# Patient Record
Sex: FEMALE | Race: WHITE | NOT HISPANIC OR LATINO | Employment: FULL TIME | URBAN - NONMETROPOLITAN AREA
[De-identification: names, ages, dates, MRNs, and addresses within clinical notes are randomized per-mention and may not be internally consistent; named-entity substitution may affect disease eponyms.]

---

## 2017-01-19 ENCOUNTER — HOSPITAL ENCOUNTER (EMERGENCY)
Facility: HOSPITAL | Age: 27
Discharge: HOME | End: 2017-01-19
Admitting: PHYSICIAN ASSISTANT

## 2017-01-19 VITALS
HEART RATE: 81 BPM | HEIGHT: 62 IN | WEIGHT: 117.2 LBS | TEMPERATURE: 98.1 F | OXYGEN SATURATION: 100 % | RESPIRATION RATE: 20 BRPM | DIASTOLIC BLOOD PRESSURE: 80 MMHG | SYSTOLIC BLOOD PRESSURE: 133 MMHG | BODY MASS INDEX: 21.57 KG/M2

## 2017-01-19 DIAGNOSIS — J06.9 ACUTE URI: ICD-10-CM

## 2017-01-19 DIAGNOSIS — N39.0 ACUTE UTI: Primary | ICD-10-CM

## 2017-01-19 LAB
BACTERIA UR QL AUTO: ABNORMAL /HPF
BILIRUB UR QL STRIP: NEGATIVE
CLARITY UR: CLEAR
COLOR UR: YELLOW
GLUCOSE UR STRIP-MCNC: NEGATIVE MG/DL
HGB UR QL STRIP.AUTO: ABNORMAL
HYALINE CASTS UR QL AUTO: ABNORMAL /LPF
KETONES UR QL STRIP: NEGATIVE
LEUKOCYTE ESTERASE UR QL STRIP.AUTO: ABNORMAL
NITRITE UR QL STRIP: NEGATIVE
PH UR STRIP.AUTO: 7 [PH] (ref 5–8)
PROT UR QL STRIP: NEGATIVE
RBC # UR: ABNORMAL /HPF
REF LAB TEST METHOD: ABNORMAL
S PYO AG THROAT QL: NEGATIVE
SP GR UR STRIP: 1.01 (ref 1–1.03)
SQUAMOUS #/AREA URNS HPF: ABNORMAL /HPF
UROBILINOGEN UR QL STRIP: ABNORMAL
WBC UR QL AUTO: ABNORMAL /HPF

## 2017-01-19 PROCEDURE — 81001 URINALYSIS AUTO W/SCOPE: CPT | Performed by: PHYSICIAN ASSISTANT

## 2017-01-19 PROCEDURE — 87880 STREP A ASSAY W/OPTIC: CPT | Performed by: PHYSICIAN ASSISTANT

## 2017-01-19 PROCEDURE — 87147 CULTURE TYPE IMMUNOLOGIC: CPT | Performed by: PHYSICIAN ASSISTANT

## 2017-01-19 PROCEDURE — 87086 URINE CULTURE/COLONY COUNT: CPT | Performed by: PHYSICIAN ASSISTANT

## 2017-01-19 PROCEDURE — 99283 EMERGENCY DEPT VISIT LOW MDM: CPT

## 2017-01-19 PROCEDURE — 87081 CULTURE SCREEN ONLY: CPT | Performed by: PHYSICIAN ASSISTANT

## 2017-01-19 PROCEDURE — 87077 CULTURE AEROBIC IDENTIFY: CPT | Performed by: PHYSICIAN ASSISTANT

## 2017-01-19 RX ORDER — CIPROFLOXACIN 500 MG/1
500 TABLET, FILM COATED ORAL 2 TIMES DAILY
Qty: 14 TABLET | Refills: 0 | Status: SHIPPED | OUTPATIENT
Start: 2017-01-19 | End: 2017-01-26

## 2017-01-19 RX ORDER — PHENAZOPYRIDINE HYDROCHLORIDE 100 MG/1
100 TABLET, FILM COATED ORAL 3 TIMES DAILY PRN
Qty: 3 TABLET | Refills: 0 | Status: SHIPPED | OUTPATIENT
Start: 2017-01-19

## 2017-01-21 LAB — BACTERIA SPEC AEROBE CULT: NORMAL

## 2017-01-22 LAB — BACTERIA SPEC AEROBE CULT: ABNORMAL

## 2017-11-07 ENCOUNTER — OFFICE VISIT (OUTPATIENT)
Dept: FAMILY MEDICINE CLINIC | Age: 27
End: 2017-11-07
Payer: MEDICAID

## 2017-11-07 VITALS
SYSTOLIC BLOOD PRESSURE: 102 MMHG | BODY MASS INDEX: 21.44 KG/M2 | TEMPERATURE: 98.2 F | WEIGHT: 117.2 LBS | DIASTOLIC BLOOD PRESSURE: 68 MMHG | OXYGEN SATURATION: 98 % | HEART RATE: 94 BPM | RESPIRATION RATE: 16 BRPM

## 2017-11-07 DIAGNOSIS — H66.002 ACUTE SUPPURATIVE OTITIS MEDIA OF LEFT EAR WITHOUT SPONTANEOUS RUPTURE OF TYMPANIC MEMBRANE, RECURRENCE NOT SPECIFIED: Primary | ICD-10-CM

## 2017-11-07 PROCEDURE — 99213 OFFICE O/P EST LOW 20 MIN: CPT | Performed by: NURSE PRACTITIONER

## 2017-11-07 RX ORDER — CIPROFLOXACIN AND DEXAMETHASONE 3; 1 MG/ML; MG/ML
4 SUSPENSION/ DROPS AURICULAR (OTIC) 2 TIMES DAILY
Qty: 7.5 ML | Refills: 0 | Status: SHIPPED | OUTPATIENT
Start: 2017-11-07 | End: 2017-11-14

## 2017-11-07 RX ORDER — METHYLPREDNISOLONE 4 MG/1
TABLET ORAL
Qty: 1 KIT | Refills: 0 | Status: SHIPPED | OUTPATIENT
Start: 2017-11-07 | End: 2017-11-13

## 2017-11-07 RX ORDER — CEFDINIR 300 MG/1
300 CAPSULE ORAL 2 TIMES DAILY
Qty: 20 CAPSULE | Refills: 0 | Status: SHIPPED | OUTPATIENT
Start: 2017-11-07 | End: 2017-11-17

## 2017-11-07 ASSESSMENT — ENCOUNTER SYMPTOMS
RHINORRHEA: 1
SORE THROAT: 1
COUGH: 1
ABDOMINAL PAIN: 0

## 2017-11-07 NOTE — PROGRESS NOTES
She is not diaphoretic. Psychiatric: Her behavior is normal. Thought content normal.   Nursing note and vitals reviewed. /68 (Site: Right Arm, Position: Sitting, Cuff Size: Medium Adult)   Pulse 94   Temp 98.2 °F (36.8 °C) (Oral)   Resp 16   Wt 117 lb 3.2 oz (53.2 kg)   LMP 10/25/2017 (Approximate)   SpO2 98%   BMI 21.44 kg/m²     Assessment:      1. Acute suppurative otitis media of left ear without spontaneous rupture of tympanic membrane, recurrence not specified  methylPREDNISolone (MEDROL DOSEPACK) 4 MG tablet    cefdinir (OMNICEF) 300 MG capsule    ciprofloxacin-dexamethasone (CIPRODEX) 0.3-0.1 % otic suspension           Plan:      Patient has ruptured TM before in the past, ciprodex topical.   Increase fluids and rest.  Tylenol or ibuprofen as needed. Follow up in two weeks to recheck ear/TM. Vitamin C and zinc supplement as directed. Throat/cough lozenges as needed.

## 2020-12-09 ENCOUNTER — OFFICE VISIT (OUTPATIENT)
Dept: PRIMARY CARE CLINIC | Age: 30
End: 2020-12-09
Payer: MEDICAID

## 2020-12-09 VITALS
HEIGHT: 62 IN | TEMPERATURE: 98.6 F | RESPIRATION RATE: 18 BRPM | DIASTOLIC BLOOD PRESSURE: 72 MMHG | BODY MASS INDEX: 20.65 KG/M2 | HEART RATE: 84 BPM | WEIGHT: 112.2 LBS | SYSTOLIC BLOOD PRESSURE: 118 MMHG

## 2020-12-09 PROCEDURE — 99213 OFFICE O/P EST LOW 20 MIN: CPT | Performed by: NURSE PRACTITIONER

## 2020-12-09 RX ORDER — METHOCARBAMOL 500 MG/1
500 TABLET, FILM COATED ORAL 3 TIMES DAILY
Qty: 21 TABLET | Refills: 0 | Status: SHIPPED | OUTPATIENT
Start: 2020-12-09 | End: 2020-12-16

## 2020-12-09 RX ORDER — TRIAMCINOLONE ACETONIDE 40 MG/ML
60 INJECTION, SUSPENSION INTRA-ARTICULAR; INTRAMUSCULAR ONCE
Status: COMPLETED | OUTPATIENT
Start: 2020-12-09 | End: 2020-12-09

## 2020-12-09 RX ADMIN — TRIAMCINOLONE ACETONIDE 60 MG: 40 INJECTION, SUSPENSION INTRA-ARTICULAR; INTRAMUSCULAR at 11:23

## 2020-12-09 ASSESSMENT — PATIENT HEALTH QUESTIONNAIRE - PHQ9
SUM OF ALL RESPONSES TO PHQ QUESTIONS 1-9: 0
1. LITTLE INTEREST OR PLEASURE IN DOING THINGS: 0
DEPRESSION UNABLE TO ASSESS: PT REFUSES
SUM OF ALL RESPONSES TO PHQ9 QUESTIONS 1 & 2: 0
2. FEELING DOWN, DEPRESSED OR HOPELESS: 0

## 2020-12-09 ASSESSMENT — ENCOUNTER SYMPTOMS
VOMITING: 0
SORE THROAT: 0
HEMOPTYSIS: 0
ORTHOPNEA: 0
SPUTUM PRODUCTION: 0
RHINORRHEA: 0
ABDOMINAL PAIN: 0
COUGH: 0
NAUSEA: 0
CHEST TIGHTNESS: 0
BACK PAIN: 0
SINUS PAIN: 0
SHORTNESS OF BREATH: 0

## 2020-12-09 NOTE — LETTER
[facility] ([facility] [address]  Phone: [phone]  Fax: [phone]    MARISSA Morris CNP        December 9, 2020     Patient: Rachael Marie   YOB: 1990   Date of Visit: 12/9/2020       To Whom It May Concern:    Rachael Marie was seen in our clinic on 12/9/2020. She may return to work on 12/10/2020. If you have any questions or concerns, please don't hesitate to call.     Sincerely,        MARISSA Morris CNP

## 2020-12-09 NOTE — PROGRESS NOTES
[facility] [place] [facility] [address] [address]  Dept: [phone]  Dept Fax: [phone]  Loc: [phone]    Lili Hoffmann is a 31 y.o. female who presents today for her medical conditions/complaintsas noted below. Lili Hoffmann is c/o of Shoulder Pain (Patient states has had Right shoulder pain and chest pain x 1 week. Patient states she was seen at [facility] [place] [facility]. Patient has had 2 EKG's and a chest X-ray [facility] was negative )      HPI:   Patient states that the pain started about 1 week ago in her right upper chest and shoulder. .  Hurts when she takes a deep breath or if she put weight on her right arm around the ribs of her upper right chest and axilla. About 1 month ago remembers shoulder popping and having pain. But then it become better. Was at the ER Saturday and they did chest xray that was normal and EKG was normal.  She went to [facility] [place] yesterday and they said that it maybe anxiety and did not do anything for her. She states that she thinks that it is muscular and is not sure what to do for relief. No shortness of breath, no nausea, pain does not radiate to arm neck or jaw. Chest Pain    This is a new problem. The current episode started in the past 7 days. The onset quality is gradual. The problem occurs 2 to 4 times per day. The problem has been unchanged. Pain location: right upper chest and right lateral chest. The pain is at a severity of 3/10. The pain is mild. The quality of the pain is described as burning and sharp. The pain radiates to the right shoulder. Pertinent negatives include no abdominal pain, back pain, claudication, cough, diaphoresis, dizziness, exertional chest pressure, fever, headaches, hemoptysis, irregular heartbeat, leg pain, lower extremity edema, malaise/fatigue, nausea, near-syncope, numbness, orthopnea, palpitations, PND, shortness of breath, sputum production, syncope, vomiting or weakness.  The pain is aggravated by deep breathing, lifting, lifting arm and movement. She has tried acetaminophen for the symptoms. The treatment provided no relief. There are no known risk factors. Pertinent negatives for past medical history include no CHF and no MI. History reviewed. No pertinent past medical history. Past Surgical History:   Procedure Laterality Date    TONSILLECTOMY       History reviewed. No pertinent family history. Social History     Tobacco Use    Smoking status: Current Every Day Smoker    Smokeless tobacco: Never Used   Substance Use Topics    Alcohol use: No     Alcohol/week: 0.0 standard drinks      No current outpatient medications on file prior to visit. No current facility-administered medications on file prior to visit. Allergies   Allergen Reactions    Pcn [Penicillins]      Health Maintenance   Topic Date Due    Varicella vaccine (1 of 2 - 2-dose childhood series) 10/21/1993    HIV screen  10/21/2007    DTaP/Tdap/Td vaccine (1 - Tdap) 10/21/2011    Cervical cancer screen  10/21/2013    Flu vaccine (1) 09/01/2020    Hepatitis A vaccine  Aged Out    Hepatitis B vaccine  Aged Out    Hib vaccine  Aged Out    Meningococcal (ACWY) vaccine  Aged Out    Pneumococcal 0-64 years Vaccine  Aged Out       Subjective:   Review of Systems   Constitutional: Negative for chills, diaphoresis, fatigue, fever and malaise/fatigue. HENT: Negative for congestion, ear pain, postnasal drip, rhinorrhea, sinus pain and sore throat. Respiratory: Negative for cough, hemoptysis, sputum production, chest tightness and shortness of breath. Cardiovascular: Positive for chest pain. Negative for palpitations, orthopnea, claudication, syncope, PND and near-syncope. Gastrointestinal: Negative for abdominal pain, nausea and vomiting. Musculoskeletal: Positive for arthralgias (right shoulder and rib pain). Negative for back pain. Skin: Negative for rash.    Neurological: Negative for dizziness, weakness, numbness and headaches. Objective:   /72 (Site: Right Upper Arm, Position: Sitting)   Pulse 84   Temp 98.6 °F (37 °C) (Temporal)   Resp 18   Ht 5' 2\" (1.575 m)   Wt 112 lb 3.2 oz (50.9 kg)   LMP 11/25/2020   BMI 20.52 kg/m²    Physical Exam  Vitals signs and nursing note reviewed. Constitutional:       General: She is not in acute distress. Appearance: Normal appearance. She is not ill-appearing. HENT:      Head: Normocephalic. Eyes:      Pupils: Pupils are equal, round, and reactive to light. Neck:      Musculoskeletal: Normal range of motion and neck supple. Cardiovascular:      Rate and Rhythm: Normal rate and regular rhythm. Pulses: Normal pulses. Heart sounds: Normal heart sounds. Pulmonary:      Effort: Pulmonary effort is normal.      Breath sounds: Normal breath sounds. Musculoskeletal:         General: Tenderness (reproducible sharp pain with AP pressure applied to right chest. ) present. No swelling or signs of injury. Right shoulder: She exhibits tenderness. She exhibits normal range of motion, no bony tenderness, no swelling, no effusion and no crepitus. Skin:     General: Skin is warm and dry. Capillary Refill: Capillary refill takes less than 2 seconds. Neurological:      Mental Status: She is alert. No results found for this visit on 12/09/20. Assessment:      Diagnosis Orders   1. Costochondritis, acute  triamcinolone acetonide (KENALOG-40) injection 60 mg    methocarbamol (ROBAXIN) 500 MG tablet       Plan:   Sissy Brewster was seen today for shoulder pain. Diagnoses and all orders for this visit:    Costochondritis, acute  -     triamcinolone acetonide (KENALOG-40) injection 60 mg  -     methocarbamol (ROBAXIN) 500 MG tablet; Take 1 tablet by mouth 3 times daily for 7 days         No follow-ups on file. Rest as needed. Will give steroid shot in the office today to decrease inflammation.   Will give muscle

## 2022-07-11 ENCOUNTER — OFFICE VISIT (OUTPATIENT)
Dept: PRIMARY CARE CLINIC | Age: 32
End: 2022-07-11
Payer: MEDICAID

## 2022-07-11 VITALS
SYSTOLIC BLOOD PRESSURE: 114 MMHG | DIASTOLIC BLOOD PRESSURE: 70 MMHG | HEIGHT: 62 IN | WEIGHT: 126 LBS | HEART RATE: 68 BPM | BODY MASS INDEX: 23.19 KG/M2 | OXYGEN SATURATION: 99 % | RESPIRATION RATE: 18 BRPM | TEMPERATURE: 98.2 F

## 2022-07-11 DIAGNOSIS — H65.112 ACUTE MUCOID OTITIS MEDIA OF LEFT EAR: Primary | ICD-10-CM

## 2022-07-11 DIAGNOSIS — H65.91 OTITIS MEDIA WITH EFFUSION, RIGHT: ICD-10-CM

## 2022-07-11 DIAGNOSIS — J01.90 ACUTE RHINOSINUSITIS: ICD-10-CM

## 2022-07-11 PROCEDURE — 99213 OFFICE O/P EST LOW 20 MIN: CPT | Performed by: NURSE PRACTITIONER

## 2022-07-11 RX ORDER — AZITHROMYCIN 250 MG/1
250 TABLET, FILM COATED ORAL SEE ADMIN INSTRUCTIONS
Qty: 6 TABLET | Refills: 0 | Status: SHIPPED | OUTPATIENT
Start: 2022-07-11 | End: 2022-07-16

## 2022-07-11 RX ORDER — CETIRIZINE HYDROCHLORIDE 10 MG/1
10 TABLET ORAL DAILY
Qty: 30 TABLET | Refills: 0 | Status: SHIPPED | OUTPATIENT
Start: 2022-07-11 | End: 2022-07-26 | Stop reason: ALTCHOICE

## 2022-07-11 RX ORDER — METHYLPREDNISOLONE 4 MG/1
TABLET ORAL
Qty: 1 KIT | Refills: 0 | Status: SHIPPED | OUTPATIENT
Start: 2022-07-11 | End: 2022-07-26 | Stop reason: ALTCHOICE

## 2022-07-11 RX ORDER — FLUTICASONE PROPIONATE 50 MCG
2 SPRAY, SUSPENSION (ML) NASAL DAILY
Qty: 16 G | Refills: 0 | Status: SHIPPED | OUTPATIENT
Start: 2022-07-11

## 2022-07-11 ASSESSMENT — ENCOUNTER SYMPTOMS
SINUS PRESSURE: 1
GASTROINTESTINAL NEGATIVE: 1
RESPIRATORY NEGATIVE: 1
ALLERGIC/IMMUNOLOGIC NEGATIVE: 1
EYES NEGATIVE: 1

## 2022-07-11 NOTE — PROGRESS NOTES
Allergic/Immunologic: Negative. Neurological: Negative. Hematological: Negative. Psychiatric/Behavioral: Negative. Objective   Physical Exam  Vitals reviewed. HENT:      Right Ear: Ear canal and external ear normal. No drainage. A middle ear effusion is present. Tympanic membrane is not perforated or erythematous. Left Ear: Ear canal and external ear normal. No drainage. A middle ear effusion is present. Tympanic membrane is bulging. Tympanic membrane is not scarred, perforated or erythematous. Ears:      Comments: Mucous visible in the left ear     Nose:      Right Sinus: No maxillary sinus tenderness or frontal sinus tenderness. Left Sinus: No maxillary sinus tenderness or frontal sinus tenderness. Mouth/Throat:      Lips: Pink. Mouth: Mucous membranes are moist.      Pharynx: Posterior oropharyngeal erythema (postnasal drainage) present. No oropharyngeal exudate. Cardiovascular:      Rate and Rhythm: Normal rate and regular rhythm. Heart sounds: Normal heart sounds. Pulmonary:      Effort: Pulmonary effort is normal.      Breath sounds: Normal breath sounds. Lymphadenopathy:      Head:      Right side of head: No submandibular or tonsillar adenopathy. Left side of head: No submandibular or tonsillar adenopathy. Cervical:      Right cervical: No superficial cervical adenopathy. Left cervical: No superficial cervical adenopathy. Skin:     General: Skin is warm and dry. Neurological:      Mental Status: She is alert. An electronic signature was used to authenticate this note. --MARISSA Robison - CNP     EMR Dragon/translation disclaimer: Much of this encounter note is an electronic transcription/translation of spoken language to printed text. The electronic translation of spoken language may be erroneous, or at times, nonsensical words or phrases may be inadvertently transcribed.   Although I have reviewed the note for such errors, some may still exist.

## 2022-07-11 NOTE — PATIENT INSTRUCTIONS
Completely finished antibiotics. May take a probiotic or eat yogurt for GI health while taking antibiotics. Apply heat to help alleviate pain. Tylenol or Motrin for fever or pain as needed. Avoid getting water or went into ear. May place a cottonball in ear to protect against wind. I have recommended having ears rechecked in 2 weeks by your primary care provider to ensure infection has cleared, or sooner if symptoms do not improve.

## 2022-07-26 ENCOUNTER — OFFICE VISIT (OUTPATIENT)
Dept: PRIMARY CARE CLINIC | Age: 32
End: 2022-07-26
Payer: MEDICAID

## 2022-07-26 VITALS
HEART RATE: 98 BPM | TEMPERATURE: 97.8 F | WEIGHT: 126.5 LBS | HEIGHT: 62 IN | DIASTOLIC BLOOD PRESSURE: 60 MMHG | SYSTOLIC BLOOD PRESSURE: 100 MMHG | BODY MASS INDEX: 23.28 KG/M2 | OXYGEN SATURATION: 98 %

## 2022-07-26 DIAGNOSIS — R42 EPISODIC LIGHTHEADEDNESS: ICD-10-CM

## 2022-07-26 DIAGNOSIS — R00.2 PALPITATIONS: ICD-10-CM

## 2022-07-26 DIAGNOSIS — M62.81 MUSCLE WEAKNESS: ICD-10-CM

## 2022-07-26 DIAGNOSIS — R42 EPISODIC LIGHTHEADEDNESS: Primary | ICD-10-CM

## 2022-07-26 DIAGNOSIS — R42 DIZZINESS: ICD-10-CM

## 2022-07-26 DIAGNOSIS — H69.83 DYSFUNCTION OF BOTH EUSTACHIAN TUBES: ICD-10-CM

## 2022-07-26 LAB
ALBUMIN SERPL-MCNC: 4.7 G/DL (ref 3.5–5.2)
ALP BLD-CCNC: 55 U/L (ref 35–104)
ALT SERPL-CCNC: 12 U/L (ref 5–33)
ANION GAP SERPL CALCULATED.3IONS-SCNC: 13 MMOL/L (ref 7–19)
AST SERPL-CCNC: 19 U/L (ref 5–32)
BASOPHILS ABSOLUTE: 0.1 K/UL (ref 0–0.2)
BASOPHILS RELATIVE PERCENT: 0.7 % (ref 0–1)
BILIRUB SERPL-MCNC: <0.2 MG/DL (ref 0.2–1.2)
BUN BLDV-MCNC: 10 MG/DL (ref 6–20)
CALCIUM SERPL-MCNC: 10 MG/DL (ref 8.6–10)
CHLORIDE BLD-SCNC: 106 MMOL/L (ref 98–111)
CO2: 24 MMOL/L (ref 22–29)
CREAT SERPL-MCNC: 0.7 MG/DL (ref 0.5–0.9)
EOSINOPHILS ABSOLUTE: 0.5 K/UL (ref 0–0.6)
EOSINOPHILS RELATIVE PERCENT: 5.1 % (ref 0–5)
GFR AFRICAN AMERICAN: >59
GFR NON-AFRICAN AMERICAN: >60
GLUCOSE BLD-MCNC: 78 MG/DL (ref 74–109)
HCT VFR BLD CALC: 40.2 % (ref 37–47)
HEMOGLOBIN: 13.2 G/DL (ref 12–16)
IMMATURE GRANULOCYTES #: 0 K/UL
LYMPHOCYTES ABSOLUTE: 2.9 K/UL (ref 1.1–4.5)
LYMPHOCYTES RELATIVE PERCENT: 29.6 % (ref 20–40)
MAGNESIUM: 2.4 MG/DL (ref 1.6–2.6)
MCH RBC QN AUTO: 32.7 PG (ref 27–31)
MCHC RBC AUTO-ENTMCNC: 32.8 G/DL (ref 33–37)
MCV RBC AUTO: 99.5 FL (ref 81–99)
MONOCYTES ABSOLUTE: 0.7 K/UL (ref 0–0.9)
MONOCYTES RELATIVE PERCENT: 6.9 % (ref 0–10)
NEUTROPHILS ABSOLUTE: 5.5 K/UL (ref 1.5–7.5)
NEUTROPHILS RELATIVE PERCENT: 57.5 % (ref 50–65)
PDW BLD-RTO: 13.6 % (ref 11.5–14.5)
PLATELET # BLD: 243 K/UL (ref 130–400)
PMV BLD AUTO: 11.9 FL (ref 9.4–12.3)
POTASSIUM SERPL-SCNC: 3.9 MMOL/L (ref 3.5–5)
RBC # BLD: 4.04 M/UL (ref 4.2–5.4)
SODIUM BLD-SCNC: 143 MMOL/L (ref 136–145)
T4 FREE: 1.53 NG/DL (ref 0.93–1.7)
TOTAL PROTEIN: 7.2 G/DL (ref 6.6–8.7)
TSH SERPL DL<=0.05 MIU/L-ACNC: 0.12 UIU/ML (ref 0.27–4.2)
WBC # BLD: 9.6 K/UL (ref 4.8–10.8)

## 2022-07-26 PROCEDURE — 99214 OFFICE O/P EST MOD 30 MIN: CPT | Performed by: NURSE PRACTITIONER

## 2022-07-26 ASSESSMENT — PATIENT HEALTH QUESTIONNAIRE - PHQ9
SUM OF ALL RESPONSES TO PHQ QUESTIONS 1-9: 11
SUM OF ALL RESPONSES TO PHQ9 QUESTIONS 1 & 2: 3
SUM OF ALL RESPONSES TO PHQ QUESTIONS 1-9: 11
2. FEELING DOWN, DEPRESSED OR HOPELESS: 1
SUM OF ALL RESPONSES TO PHQ QUESTIONS 1-9: 11
6. FEELING BAD ABOUT YOURSELF - OR THAT YOU ARE A FAILURE OR HAVE LET YOURSELF OR YOUR FAMILY DOWN: 0
7. TROUBLE CONCENTRATING ON THINGS, SUCH AS READING THE NEWSPAPER OR WATCHING TELEVISION: 2
8. MOVING OR SPEAKING SO SLOWLY THAT OTHER PEOPLE COULD HAVE NOTICED. OR THE OPPOSITE, BEING SO FIGETY OR RESTLESS THAT YOU HAVE BEEN MOVING AROUND A LOT MORE THAN USUAL: 0
9. THOUGHTS THAT YOU WOULD BE BETTER OFF DEAD, OR OF HURTING YOURSELF: 0
5. POOR APPETITE OR OVEREATING: 2
10. IF YOU CHECKED OFF ANY PROBLEMS, HOW DIFFICULT HAVE THESE PROBLEMS MADE IT FOR YOU TO DO YOUR WORK, TAKE CARE OF THINGS AT HOME, OR GET ALONG WITH OTHER PEOPLE: 0
3. TROUBLE FALLING OR STAYING ASLEEP: 1
4. FEELING TIRED OR HAVING LITTLE ENERGY: 3
1. LITTLE INTEREST OR PLEASURE IN DOING THINGS: 2
SUM OF ALL RESPONSES TO PHQ QUESTIONS 1-9: 11

## 2022-07-26 ASSESSMENT — ENCOUNTER SYMPTOMS
SORE THROAT: 0
NAUSEA: 0
COUGH: 1
SHORTNESS OF BREATH: 0
VOMITING: 0
DIARRHEA: 0
ABDOMINAL PAIN: 0

## 2022-07-26 NOTE — PROGRESS NOTES
Kenneth Dominguez (:  1990) is a 36 y.o. female,Established patient, here for evaluation of the following chief complaint(s):  Ear Fullness and Dizziness      ASSESSMENT/PLAN:    ICD-10-CM    1. Episodic lightheadedness  R42 CBC with Auto Differential     Comprehensive Metabolic Panel     TSH     T4, Free     Longterm Continuous Cardiac Event Monitor  Slow position changes      2. Dysfunction of both eustachian tubes  H69.83 Increase Flonase to twice daily      3. Palpitations  R00.2 TSH     T4, Free     Longterm Continuous Cardiac Event Monitor      4. Dizziness  R42 Longterm Continuous Cardiac Event Monitor      5. Muscle weakness  M62.81 Magnesium          Return in about 6 weeks (around 2022), or if symptoms worsen or fail to improve. SUBJECTIVE/OBJECTIVE:  HPI    Reports dizziness. \"This is hard from me to explain. \"  Denies a spinning sensation. \"This comes and goes. \"  Reports lightheadedness. \"If I bend over to pick something up and stand back up, I am going to feel it. \"  \"Normally, I am okay first thing in the morning. \"  She feels like she might pass out. She feels like her heart \"flutters. \"  \"I have been dealing with this for a year and half with my ears. \"  Reports a decrease in her hearing. \"I struggle. \"  Her ears feel full. She was recently on a steroid and antibiotic. Pressure is better. She is currently on Flonase daily. Episodes last from 20 minutes to hours    LAYING BP: 110/60, 71  SITTING BP: 104/60, 86 (Dizzy from sitting to standing)  STANDING BP: 100/60, 98    /60 (Position: Standing)   Pulse 98   Temp 97.8 °F (36.6 °C) (Temporal)   Ht 5' 2\" (1.575 m)   Wt 126 lb 8 oz (57.4 kg)   LMP 2022 (Approximate)   SpO2 98%   BMI 23.14 kg/m²     Review of Systems   Constitutional:  Negative for fever. HENT:  Positive for congestion and ear pain (ear pressure). Negative for sore throat. Eyes:  Negative for visual disturbance.         Denies blurred vision or double vision   Respiratory:  Positive for cough (occassional if things get bad). Negative for shortness of breath. Cardiovascular:  Positive for palpitations. Negative for chest pain. Gastrointestinal:  Negative for abdominal pain, diarrhea, nausea and vomiting. Genitourinary:  Negative for dysuria. Neurological:  Positive for dizziness and light-headedness. Physical Exam  Vitals reviewed. Constitutional:       Appearance: Normal appearance. She is well-developed and normal weight. HENT:      Head: Normocephalic. Right Ear: External ear normal. Tympanic membrane is scarred. Left Ear: External ear normal. Tympanic membrane is scarred. Nose: Nose normal.   Eyes:      General:         Right eye: No discharge. Left eye: No discharge. Cardiovascular:      Rate and Rhythm: Normal rate and regular rhythm. Pulmonary:      Effort: Pulmonary effort is normal.      Breath sounds: Normal breath sounds. No wheezing, rhonchi or rales. Abdominal:      General: Bowel sounds are normal.      Palpations: Abdomen is soft. Musculoskeletal:         General: Normal range of motion. Cervical back: Normal range of motion. Skin:     General: Skin is dry. Neurological:      General: No focal deficit present. Mental Status: She is alert and oriented to person, place, and time. Mental status is at baseline. Psychiatric:         Mood and Affect: Mood normal.         Behavior: Behavior normal.         Thought Content: Thought content normal.         Judgment: Judgment normal.               An electronic signature was used to authenticate this note.     --Suzette Cates, MARISSA

## 2022-07-27 ENCOUNTER — TELEPHONE (OUTPATIENT)
Dept: PRIMARY CARE CLINIC | Age: 32
End: 2022-07-27

## 2022-08-11 ENCOUNTER — TELEPHONE (OUTPATIENT)
Dept: PRIMARY CARE CLINIC | Age: 32
End: 2022-08-11

## 2022-08-11 DIAGNOSIS — R42 DIZZINESS: Primary | ICD-10-CM

## 2022-08-11 NOTE — TELEPHONE ENCOUNTER
----- Message from MARISSA Chavez sent at 8/10/2022  4:13 PM CDT -----  Zio patch was worn for 4 days and 3 hours. It is normal.  Patient is in sinus rhythm from 47 up to 139. Please check on her symptoms of dizziness.

## 2022-09-14 ENCOUNTER — OFFICE VISIT (OUTPATIENT)
Dept: ENT CLINIC | Age: 32
End: 2022-09-14
Payer: MEDICAID

## 2022-09-14 ENCOUNTER — PROCEDURE VISIT (OUTPATIENT)
Dept: ENT CLINIC | Age: 32
End: 2022-09-14
Payer: MEDICAID

## 2022-09-14 VITALS
BODY MASS INDEX: 23 KG/M2 | WEIGHT: 125 LBS | SYSTOLIC BLOOD PRESSURE: 112 MMHG | HEIGHT: 62 IN | DIASTOLIC BLOOD PRESSURE: 62 MMHG

## 2022-09-14 DIAGNOSIS — R26.89 BALANCE DISORDER: Primary | ICD-10-CM

## 2022-09-14 DIAGNOSIS — R42 DIZZINESS: Primary | ICD-10-CM

## 2022-09-14 PROCEDURE — 92553 AUDIOMETRY AIR & BONE: CPT | Performed by: AUDIOLOGIST

## 2022-09-14 PROCEDURE — 99204 OFFICE O/P NEW MOD 45 MIN: CPT | Performed by: OTOLARYNGOLOGY

## 2022-09-14 PROCEDURE — 92567 TYMPANOMETRY: CPT | Performed by: AUDIOLOGIST

## 2022-09-14 RX ORDER — PREDNISONE 20 MG/1
TABLET ORAL
Qty: 21 TABLET | Refills: 0 | Status: SHIPPED | OUTPATIENT
Start: 2022-09-14

## 2022-09-14 ASSESSMENT — ENCOUNTER SYMPTOMS
GASTROINTESTINAL NEGATIVE: 1
RESPIRATORY NEGATIVE: 1
ALLERGIC/IMMUNOLOGIC NEGATIVE: 1
EYES NEGATIVE: 1

## 2022-09-14 NOTE — PROGRESS NOTES
History   Jacqueline Fernandez is a 36 y.o. female who presented to the clinic this date with complaints of dizziness and decreased hearing. She reported symptoms have been ongoing for several months. She was treated for an ear infection then started having dizzy spells and losing balance. She denied true vertigo. She noted the hearing problems have started in the last month. She noted bilateral aural fullness. She reported occasional headaches. She reported occasional tinnitus. Summary   Results obtained today are consistent with normal TM mobility and normal hearing bilaterally. A Keesha Fluke was performed and was negative for BPPV bilaterally. 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 Anita Ghosh and the following recommendations were made: Follow up with ENT as scheduled.         Audiogram and Acoustic Immittance

## 2022-09-14 NOTE — PROGRESS NOTES
2022    Jacinto Pablo (:  1990) is a 36 y.o. female, Established patient, here for evaluation of the following chief complaint(s):  New Patient (dizziness)      Vitals:    22 1336   BP: 112/62   Weight: 125 lb (56.7 kg)   Height: 5' 2\" (1.575 m)       Wt Readings from Last 3 Encounters:   22 125 lb (56.7 kg)   22 126 lb 8 oz (57.4 kg)   22 126 lb (57.2 kg)       BP Readings from Last 3 Encounters:   22 112/62   22 100/60   22 114/70         SUBJECTIVE/OBJECTIVE:    Patient seen today for balance disorder. She says for a month or 2 she had issues with feel like she is got a fall. Generally only happens when she first stands up. It cannot happen in any other position. Does not happen all the time but it does happen occasionally. About the same at times she feels pressure in her left ear. She feels like her hearing is down on side as well. She using Flonase but is not helping. Review of Systems   Constitutional: Negative. HENT:  Positive for hearing loss. Pressure left ear   Eyes: Negative. Respiratory: Negative. Cardiovascular: Negative. Gastrointestinal: Negative. Endocrine: Negative. Musculoskeletal: Negative. Skin: Negative. Allergic/Immunologic: Negative. Neurological:  Positive for dizziness. Hematological: Negative. Psychiatric/Behavioral: Negative. Physical Exam  Vitals reviewed. Constitutional:       Appearance: Normal appearance. She is normal weight. HENT:      Head: Normocephalic and atraumatic. Right Ear: Tympanic membrane, ear canal and external ear normal.      Left Ear: Tympanic membrane, ear canal and external ear normal.      Nose: Nose normal.      Mouth/Throat:      Mouth: Mucous membranes are moist.      Pharynx: Oropharynx is clear. Eyes:      Extraocular Movements: Extraocular movements intact. Pupils: Pupils are equal, round, and reactive to light. Cardiovascular:      Rate and Rhythm: Normal rate and regular rhythm. Pulmonary:      Effort: Pulmonary effort is normal.      Breath sounds: Normal breath sounds. Musculoskeletal:      Cervical back: Normal range of motion. Skin:     General: Skin is warm and dry. Neurological:      General: No focal deficit present. Mental Status: She is alert and oriented to person, place, and time. Psychiatric:         Mood and Affect: Mood normal.         Behavior: Behavior normal.            ASSESSMENT/PLAN:    1. Balance disorder  This is likely an inner ear issue and I think a round of steroids to help calm this down. Lites are back about 3 weeks after the steroids take effect. Return in about 3 weeks (around 10/5/2022). An electronic signature was used to authenticate this note. Sumeet Leslie MD       Please note that this chart was generated using dragon dictation software. Although every effort was made to ensure the accuracy of this automated transcription, some errors in transcription may have occurred.